# Patient Record
Sex: FEMALE | Race: AMERICAN INDIAN OR ALASKA NATIVE | URBAN - METROPOLITAN AREA
[De-identification: names, ages, dates, MRNs, and addresses within clinical notes are randomized per-mention and may not be internally consistent; named-entity substitution may affect disease eponyms.]

---

## 2022-03-22 ENCOUNTER — TELEPHONE (OUTPATIENT)
Dept: ONCOLOGY | Age: 57
End: 2022-03-22

## 2022-03-22 NOTE — TELEPHONE ENCOUNTER
Patient stopped by the office thinking she had an appointment today. Patient was not on today's schedule. Upon review, system shows patient was scheduled originally on 2/17/22, but cancelled and rescheduled for 2/22/22, where she was a \"No Show\". Patient rescheduled today (3/22/22) for 4/19/22, but had to reschedule due to an upcoming \"Colonoscopy\". Patient has now been rescheduled for 4/26/22 @ 830am, and has confirmed new date/time/location.

## 2022-04-22 NOTE — PROGRESS NOTES
Justino Schneider is a 65 y.o. female here for new patient appt for thrombocytosis/FARIBA/leukocytosis. Referred by  Dr Kati Pavon  Pt states she is fatigued. She had colonoscopy done last week which came back fine. She says she has colitis. She thinks some food she ate triggered it. Pharmacy: [address] 1. Have you been to the ER, urgent care clinic since your last visit? Hospitalized since your last visit? New Pt    2. Have you seen or consulted any other health care providers outside of the [address] [place] [address] since your last visit? Include any pap smears or colon screening.  New Pt

## 2022-04-26 ENCOUNTER — OFFICE VISIT (OUTPATIENT)
Dept: ONCOLOGY | Age: 57
End: 2022-04-26
Payer: COMMERCIAL

## 2022-04-26 VITALS
TEMPERATURE: 98.2 F | HEART RATE: 71 BPM | SYSTOLIC BLOOD PRESSURE: 99 MMHG | BODY MASS INDEX: 20.31 KG/M2 | HEIGHT: 68 IN | WEIGHT: 134 LBS | OXYGEN SATURATION: 95 % | DIASTOLIC BLOOD PRESSURE: 61 MMHG

## 2022-04-26 DIAGNOSIS — D75.839 THROMBOCYTOSIS: ICD-10-CM

## 2022-04-26 DIAGNOSIS — D72.829 LEUKOCYTOSIS, UNSPECIFIED TYPE: ICD-10-CM

## 2022-04-26 DIAGNOSIS — D64.9 NORMOCYTIC ANEMIA: Primary | ICD-10-CM

## 2022-04-26 DIAGNOSIS — R53.82 CHRONIC FATIGUE: ICD-10-CM

## 2022-04-26 PROCEDURE — 99204 OFFICE O/P NEW MOD 45 MIN: CPT | Performed by: STUDENT IN AN ORGANIZED HEALTH CARE EDUCATION/TRAINING PROGRAM

## 2022-04-26 NOTE — PROGRESS NOTES
[address] [facility] at [address] [facility] [address] [facility]  W: [phone] F: [phone]      Reason for Visit:   Timmy Martinez is a 65 y.o. female who is seen in consultation at the request of Dr. Oanh Sue for evaluation of normocytic anemia. Hematology / Oncology Treatment History:     Hematological/Oncological Diagnosis: Normocytic Anemia    Date of Diagnosis: 12/2021    Treatment course: Pending      History of Present Illness:     65year old female with relevant medical history most notable for history of colitis, presents for evaluation of lab abnormalities including anemia, thrombocytosis, leukocytosis. Chart reviewed, most recent labs from December of 2021 show   Iron saturation of 13%, total iron 37, no ferritin on record. Last CBC shows white blood cell count of 11.5, 12.7 g/dL, MCV of 82, platelet count of 675. No other gross abnormalities on differential.    The patient reports that she has clinical symptoms that are concerning for chronic fatigue has been present for more than 8 months, nausea, abdominal bloating, as well as reported history of minor blood in stool. She apparently had a  Colonoscopy last week which did not show any active sources of bleeding but apparently did show colitis. This report is not available for independent review. As a consequence of labs in December 2021, the patient started oral iron supplementation daily. She is tolerating has been it does exacerbate her abdominal bloating and some nausea. Family history reviewed, noncontributory  Social history reviewed, also noncontributory    Review of Systems: A complete review of systems was obtained, negative except as described above. Past Medical History:   Diagnosis Date    Anemia       History reviewed. No pertinent surgical history.    Social History     Tobacco Use    Smoking status: Former Smoker    Smokeless tobacco: Never Used    Tobacco comment: quit at 33years old   Substance Use Topics    Alcohol use: Yes     Comment: social      History reviewed. No pertinent family history. Current Outpatient Medications   Medication Sig    ferrous sulfate (IRON PO) Take  by mouth.  cholecalciferol, vitamin D3, (VITAMIN D3 PO) Take  by mouth. No current facility-administered medications for this visit. No Known Allergies         Physical Exam:     Visit Vitals  BP 99/61 (BP 1 Location: Left upper arm, BP Patient Position: Sitting)   Pulse 71   Temp 98.2 °F (36.8 °C) (Temporal)   Ht 5' 8\" (1.727 m)   Wt 134 lb (60.8 kg)   SpO2 95%   BMI 20.37 kg/m²     ECOG PS: 0  General: No distress  Eyes: PERRLA, anicteric sclerae  HENT: Atraumatic with normal appearance of ears and nose; OP clear  Neck: Supple; no visualized JVD  Lymphatic: No cervical, or supraclavicular lymphadenopathy. Respiratory: CTAB, normal respiratory effort  CV: Normal rate, regular rhythm, no murmurs, no peripheral edema  GI: Soft, nontender, nondistended, no palpable masses, no hepatomegaly, no splenomegaly  MS: Normal gait and station. Digits without clubbing or cyanosis. Skin: No rashes, ecchymoses, or petechiae. Normal temperature, turgor, and texture. Neuro/Psych: Alert, oriented, appropriate affect, normal judgment/insight      Results:     Lab Results   Component Value Date/Time    WBC 14.6 (H) 09/18/2016 06:04 PM    HGB 12.2 09/18/2016 06:04 PM    HCT 36.6 09/18/2016 06:04 PM    PLATELET 027 89/76/0257 06:04 PM    MCV 81.2 09/18/2016 06:04 PM    ABS.  NEUTROPHILS 10.9 (H) 09/18/2016 06:04 PM     Lab Results   Component Value Date/Time    Sodium 136 09/18/2016 06:04 PM    Potassium 2.7 (LL) 09/18/2016 06:04 PM    Chloride 103 09/18/2016 06:04 PM    CO2 21 09/18/2016 06:04 PM    Glucose 103 (H) 09/18/2016 06:04 PM    BUN 16 09/18/2016 06:04 PM    Creatinine 0.97 09/18/2016 06:04 PM    GFR est AA >60 09/18/2016 06:04 PM    GFR est non-AA >60 09/18/2016 06:04 PM    Calcium 8.9 09/18/2016 06:04 PM     Lab Results   Component Value Date/Time    Bilirubin, total 1.0 09/18/2016 06:04 PM    ALT (SGPT) 22 09/18/2016 06:04 PM    Alk. phosphatase 104 09/18/2016 06:04 PM    Protein, total 8.2 09/18/2016 06:04 PM    Albumin 3.9 09/18/2016 06:04 PM    Globulin 4.3 (H) 09/18/2016 06:04 PM         Assessment and Recommendations:     # Normocytic Anemia with associated thromocytosis    - The differential diagnosis for a normocytic anemia is broad, and includes blood loss, anemia of chronic disease, chronic renal insufficiency, iron deficiency, hypothyroidism, hemolysis, and bone marrow suppression. MDS, B12 deficiency, folate deficiency, and alcohol abuse can also lead to anemia, though it is generally macrocytic. In this particular patient's case, I suspect a reactive thrombocytosis with some component of iron deficiency anemia. We will recheck iron studies today as well as the following    - I will obtain some additional labwork today to further investigate, including CBC with differential, peripheral smear review, reticulocyte count, iron profile, ferritin, B12, folate, TSH, haptoglobin, LDH, SPEP,      Plan for follow up in 2 weeks for review of results and consideration of additional need for workup.       Signed By: Giovanna Miguel MD      Attending Medical Oncologist   [facility]

## 2022-05-01 LAB
ALBUMIN SERPL ELPH-MCNC: 3.7 G/DL (ref 2.9–4.4)
ALBUMIN SERPL-MCNC: 4.2 G/DL (ref 3.8–4.9)
ALBUMIN/GLOB SERPL: 0.9 {RATIO} (ref 0.7–1.7)
ALBUMIN/GLOB SERPL: 1.1 {RATIO} (ref 1.2–2.2)
ALP SERPL-CCNC: 129 IU/L (ref 44–121)
ALPHA1 GLOB SERPL ELPH-MCNC: 0.3 G/DL (ref 0–0.4)
ALPHA2 GLOB SERPL ELPH-MCNC: 0.8 G/DL (ref 0.4–1)
ALT SERPL-CCNC: 14 IU/L (ref 0–32)
AST SERPL-CCNC: 16 IU/L (ref 0–40)
B-GLOBULIN SERPL ELPH-MCNC: 1.1 G/DL (ref 0.7–1.3)
BASOPHILS # BLD AUTO: 0.1 X10E3/UL (ref 0–0.2)
BASOPHILS NFR BLD AUTO: 1 %
BILIRUB SERPL-MCNC: 0.3 MG/DL (ref 0–1.2)
BUN SERPL-MCNC: 15 MG/DL (ref 6–24)
BUN/CREAT SERPL: 19 (ref 9–23)
CALCIUM SERPL-MCNC: 9.4 MG/DL (ref 8.7–10.2)
CHLORIDE SERPL-SCNC: 99 MMOL/L (ref 96–106)
CO2 SERPL-SCNC: 22 MMOL/L (ref 20–29)
CREAT SERPL-MCNC: 0.8 MG/DL (ref 0.57–1)
EGFR: 86 ML/MIN/1.73
EOSINOPHIL # BLD AUTO: 0.2 X10E3/UL (ref 0–0.4)
EOSINOPHIL NFR BLD AUTO: 2 %
ERYTHROCYTE [DISTWIDTH] IN BLOOD BY AUTOMATED COUNT: 13.3 % (ref 11.7–15.4)
FERRITIN SERPL-MCNC: 41 NG/ML (ref 15–150)
FOLATE SERPL-MCNC: 16.3 NG/ML
GAMMA GLOB SERPL ELPH-MCNC: 2 G/DL (ref 0.4–1.8)
GLOBULIN SER CALC-MCNC: 3.7 G/DL (ref 1.5–4.5)
GLOBULIN SER-MCNC: 4.2 G/DL (ref 2.2–3.9)
GLUCOSE SERPL-MCNC: 94 MG/DL (ref 65–99)
HAV IGM SERPL QL IA: NEGATIVE
HBV CORE IGM SERPL QL IA: NEGATIVE
HBV SURFACE AG SERPL QL IA: NEGATIVE
HCT VFR BLD AUTO: 38 % (ref 34–46.6)
HCV AB S/CO SERPL IA: 0.2 S/CO RATIO (ref 0–0.9)
HCV AB SERPL QL IA: NORMAL
HGB BLD-MCNC: 12.1 G/DL (ref 11.1–15.9)
IGA SERPL-MCNC: 147 MG/DL (ref 87–352)
IGG SERPL-MCNC: 2053 MG/DL (ref 586–1602)
IGM SERPL-MCNC: 184 MG/DL (ref 26–217)
IMM GRANULOCYTES # BLD AUTO: 0 X10E3/UL (ref 0–0.1)
IMM GRANULOCYTES NFR BLD AUTO: 0 %
INTERPRETATION SERPL IEP-IMP: ABNORMAL
IRON SATN MFR SERPL: 10 % (ref 15–55)
IRON SERPL-MCNC: 27 UG/DL (ref 27–159)
KAPPA LC FREE SER-MCNC: 40.8 MG/L (ref 3.3–19.4)
KAPPA LC FREE/LAMBDA FREE SER: 1.11 {RATIO} (ref 0.26–1.65)
LAMBDA LC FREE SERPL-MCNC: 36.8 MG/L (ref 5.7–26.3)
LDH SERPL-CCNC: 139 IU/L (ref 119–226)
LYMPHOCYTES # BLD AUTO: 2.5 X10E3/UL (ref 0.7–3.1)
LYMPHOCYTES NFR BLD AUTO: 21 %
M PROTEIN SERPL ELPH-MCNC: ABNORMAL G/DL
MCH RBC QN AUTO: 26.8 PG (ref 26.6–33)
MCHC RBC AUTO-ENTMCNC: 31.8 G/DL (ref 31.5–35.7)
MCV RBC AUTO: 84 FL (ref 79–97)
MONOCYTES # BLD AUTO: 1.1 X10E3/UL (ref 0.1–0.9)
MONOCYTES NFR BLD AUTO: 9 %
NEUTROPHILS # BLD AUTO: 7.9 X10E3/UL (ref 1.4–7)
NEUTROPHILS NFR BLD AUTO: 67 %
PATH INTERP BLD-IMP: ABNORMAL
PATH REV BLD -IMP: ABNORMAL
PATHOLOGIST NAME: ABNORMAL
PLATELET # BLD AUTO: 403 X10E3/UL (ref 150–450)
PLEASE NOTE:, 149534: ABNORMAL
POTASSIUM SERPL-SCNC: 4.1 MMOL/L (ref 3.5–5.2)
PROT SERPL-MCNC: 7.9 G/DL (ref 6–8.5)
RBC # BLD AUTO: 4.52 X10E6/UL (ref 3.77–5.28)
RETICS/RBC NFR AUTO: 1.1 % (ref 0.6–2.6)
SODIUM SERPL-SCNC: 140 MMOL/L (ref 134–144)
TIBC SERPL-MCNC: 259 UG/DL (ref 250–450)
TSH SERPL DL<=0.005 MIU/L-ACNC: 1.78 UIU/ML (ref 0.45–4.5)
UIBC SERPL-MCNC: 232 UG/DL (ref 131–425)
VIT B12 SERPL-MCNC: 701 PG/ML (ref 232–1245)
WBC # BLD AUTO: 11.7 X10E3/UL (ref 3.4–10.8)

## 2022-05-18 ENCOUNTER — VIRTUAL VISIT (OUTPATIENT)
Dept: ONCOLOGY | Age: 57
End: 2022-05-18
Payer: COMMERCIAL

## 2022-05-18 DIAGNOSIS — D72.829 LEUKOCYTOSIS, UNSPECIFIED TYPE: Primary | ICD-10-CM

## 2022-05-18 PROCEDURE — 99213 OFFICE O/P EST LOW 20 MIN: CPT | Performed by: STUDENT IN AN ORGANIZED HEALTH CARE EDUCATION/TRAINING PROGRAM

## 2022-05-18 NOTE — PROGRESS NOTES
Justino Schneider is a 65 y.o. female seeing provider today for elevated WBC count, fatigue                    1. Have you been to the ER, urgent care clinic since your last visit? Hospitalized since your last visit? No    2. Have you seen or consulted any other health care providers outside of the [address] [place] [address] since your last visit? Include any pap smears or colon screening.  No

## 2022-05-18 NOTE — PROGRESS NOTES
[address] [facility] [address] [facility] at [address] [facility] [address] [facility]  W: [phone] F: [phone]      Reason for Visit:   Annabelle Dale is a 65 y.o. female who is seen in consultation at the request of Dr. Maria C Barba for evaluation of normocytic anemia. Hematology / Oncology Treatment History:     Hematological/Oncological Diagnosis: Normocytic Anemia. Leukocytosis     Date of Diagnosis: 12/2021    Treatment course: Oral iron     History of Present Illness:     65year old female with relevant medical history most notable for history of colitis, presents for evaluation of lab abnormalities including anemia, thrombocytosis, leukocytosis. Chart reviewed, most recent labs from December of 2021 show   Iron saturation of 13%, total iron 37, no ferritin on record. Last CBC shows white blood cell count of 11.5, 12.7 g/dL, MCV of 82, platelet count of 047. No other gross abnormalities on differential.    The patient reports that she has clinical symptoms that are concerning for chronic fatigue has been present for more than 8 months, nausea, abdominal bloating, as well as reported history of minor blood in stool. She apparently had a Colonoscopy last week which did not show any active sources of bleeding but apparently did show colitis. This report is not available for independent review. As a consequence of labs in December 2021, the patient started oral iron supplementation daily. She is tolerating has been it does exacerbate her abdominal bloating and some nausea. Family history reviewed, noncontributory  Social history reviewed, also noncontributory    Review of Systems: A complete review of systems was obtained, negative except as described above.     Interval History:     5/18/22:     Justino Schneider, who was evaluated through a synchronous (real-time) audio-video encounter, and/or her healthcare decision maker, is aware that it is a billable service, which includes applicable co-pays, with coverage as determined by her insurance carrier. She provided verbal consent to proceed and patient identification was verified. This visit was conducted pursuant to the emergency declaration under the [address] [facility], [address] [place] [address] authority and the [employer] and [employer] Act. A caregiver was present when appropriate. Ability to conduct physical exam was limited. The patient was located at home in a state where the provider was licensed to provide care. --Sophie Quiroga MD on 5/18/2022 at 1:40 PM        No new clinical changes. Patient in good spirits. She reports clinical improvement of bloating and nausea since last visit. She is taking oral iron daily. No new reported bleeding or blood in stool. Past Medical History:   Diagnosis Date    Anemia       No past surgical history on file. Social History     Tobacco Use    Smoking status: Former Smoker    Smokeless tobacco: Never Used    Tobacco comment: quit at 33years old   Substance Use Topics    Alcohol use: Yes     Comment: social      No family history on file. Current Outpatient Medications   Medication Sig    MULTIVITAMIN WITH IRON PO Take 1 Tablet by mouth daily.  cholecalciferol, vitamin D3, (VITAMIN D3 PO) Take  by mouth.  ferrous sulfate (IRON PO) Take  by mouth. (Patient not taking: Reported on 5/18/2022)     No current facility-administered medications for this visit. No Known Allergies         Physical Exam:     There were no vitals taken for this visit.   ECOG PS: 0  General: alert, cooperative, no distress   Mental  status: normal mood, behavior, speech, dress, motor activity, and thought processes, able to follow commands   HENT: NCAT   Neck: no visualized mass   Resp: no respiratory distress   Neuro: no gross deficits   Skin: no discoloration or lesions of concern on visible areas Psychiatric: normal affect, consistent with stated mood, no evidence of hallucinations           Results:     Lab Results   Component Value Date/Time    WBC 11.7 (H) 04/26/2022 12:00 AM    HGB 12.1 04/26/2022 12:00 AM    HCT 38.0 04/26/2022 12:00 AM    PLATELET 480 32/24/5728 12:00 AM    MCV 84 04/26/2022 12:00 AM    ABS. NEUTROPHILS 7.9 (H) 04/26/2022 12:00 AM     Lab Results   Component Value Date/Time    Sodium 140 04/26/2022 12:00 AM    Potassium 4.1 04/26/2022 12:00 AM    Chloride 99 04/26/2022 12:00 AM    CO2 22 04/26/2022 12:00 AM    Glucose 94 04/26/2022 12:00 AM    BUN 15 04/26/2022 12:00 AM    Creatinine 0.80 04/26/2022 12:00 AM    GFR est AA >60 09/18/2016 06:04 PM    GFR est non-AA >60 09/18/2016 06:04 PM    Calcium 9.4 04/26/2022 12:00 AM     Lab Results   Component Value Date/Time    Bilirubin, total 0.3 04/26/2022 12:00 AM    ALT (SGPT) 14 04/26/2022 12:00 AM    Alk. phosphatase 129 (H) 04/26/2022 12:00 AM    Protein, total 7.9 04/26/2022 12:00 AM    Albumin 4.2 04/26/2022 12:00 AM    Globulin 4.3 (H) 09/18/2016 06:04 PM         Assessment and Recommendations:     # Leukocytosis with associated thromocytosis    Reticulocyte count is normal, Vitamin B12 is normal, Ferritin is normal at 41, LD is normal at 139, TSH is normal at 1.78, Folate is normal at 16.3, Iron saturation is LOW at 10% with low iron level at 27. CMP shows elevated alkaline phosphatase, normal creatinine. Hepatitis C is negative, Hepatitis B is negative,     - peripheral smear shows mild neutrophilia with monocytosis. WBC is elevated slightly at 11.7, Hg is normal at 12.1 g/dl, Platelets are normal now at 403.   - gammopathy profile shows polyclonal increase without M-spike    Overall clinical findings are consistent with inflammatory reactive neutrophilia. No evidence of malignancy or immature cells on smear. Anemia has resolved.         # Iron deficiency with history of normocytic anemia  -  Patient should take oral iron supplementation and increase dietary intake of oral iron. Follow up as PRN. The patient should continue following with her PCP. She should have CBC with iron profile and ferritin every 6-12 months. If she becomes anemic with Hg <11 g/dl and evidence of iron deficiency or she becomes more symptomatic, patient should call the clinic and return for evaluation and consideration of IV Iron therapy. I discussed this with the patient and she is in agreement with the plan.      Signed By: Samantha Jacques MD      Attending Medical Oncologist   [facility]